# Patient Record
Sex: MALE | URBAN - METROPOLITAN AREA
[De-identification: names, ages, dates, MRNs, and addresses within clinical notes are randomized per-mention and may not be internally consistent; named-entity substitution may affect disease eponyms.]

---

## 2021-10-25 ENCOUNTER — TELEPHONE (OUTPATIENT)
Dept: UROLOGY | Facility: CLINIC | Age: 51
End: 2021-10-25

## 2021-10-25 NOTE — TELEPHONE ENCOUNTER
[facility] Call Center    Phone Message    May a detailed message be left on voicemail: yes     Reason for Call: Luanne from [facility] would like to know when our 1st available appointment for urinary retention would be.  Protocols say to send a telephone encounter.  Please call Luanne back to discuss when our 1st available appointment would be.  Thanks.    Action Taken: Message routed to:  Adult Clinics: Urology p [id number]    Travel Screening: Not Applicable

## 2021-10-25 NOTE — TELEPHONE ENCOUNTER
Called and spoke to Luanne who is aware that the next available consult for urinary retention is 11/4/21 at 8:30am. Per Luanne, they would like this appointment. Appointment scheduled. Luanne to update patient.    Belinda Avilez RN, BSN